# Patient Record
Sex: FEMALE
[De-identification: names, ages, dates, MRNs, and addresses within clinical notes are randomized per-mention and may not be internally consistent; named-entity substitution may affect disease eponyms.]

---

## 2021-02-10 ENCOUNTER — NURSE TRIAGE (OUTPATIENT)
Dept: OTHER | Facility: CLINIC | Age: 36
End: 2021-02-10

## 2021-02-10 NOTE — TELEPHONE ENCOUNTER
Reason for Disposition   Burn area larger than 4 palms of hand (> 4% BSA)    Answer Assessment - Initial Assessment Questions  1. ONSET: \"When did it happen? \" If happened < 3 hours ago, ask: \"Did you apply cold water? \" If not, give First Aid Advice immediately. Happened about 5 minutes ago  Pt applied cold water     2. LOCATION: \"Where is the burn located? \"       Right upper thigh     3. BURN SIZE: \"How large is the burn? \"  The palm is roughly 1% of the total body surface area (BSA). \" From knee to private area\"     4. SEVERITY OF THE BURN: \"Are there any blisters? \"      2 blisters    5. MECHANISM: \"Tell me how it happened. \"      Pt was making tea and spilled hot water on her leg     6. PAIN: David Wilma you having any pain? \" \"How bad is the pain? \" (Scale 1-10; or mild, moderate, severe)    - MILD (1-3): doesn't interfere with normal activities     - MODERATE (4-7): interferes with normal activities or awakens from sleep     - SEVERE (8-10): excruciating pain, unable to do any normal activities       If she does not apply water she is in pain 3/10    7. INHALATION INJURY: \"Were you exposed to any smoke or fumes? \" If yes: \"Do you have any cough or difficulty breathing? \"      N/A    8. OTHER SYMPTOMS: \"Do you have any other symptoms? \" (e.g., headache, nausea)      Denies    9. PREGNANCY: \"Is there any chance you are pregnant? \" \"When was your last menstrual period? \"      Denies    Protocols used: BURNS-ADULT-[place]    Brief description of triage: burn to Right leg. See assessment above. Triage indicates for patient to Go to ED now. Pt and caller in agreement with this plan. Care advice provided, patient verbalizes understanding; denies any other questions or concerns; instructed to call back for any new or worsening symptoms. This triage is a result of a call to [address] [place] [address]. Please do not respond to the triage nurse through this encounter.  Any subsequent communication should be directly with the patient.

## 2024-11-15 ENCOUNTER — OFFICE VISIT (OUTPATIENT)
Dept: URGENT CARE | Age: 39
End: 2024-11-15
Payer: COMMERCIAL

## 2024-11-15 VITALS
HEIGHT: 62 IN | OXYGEN SATURATION: 97 % | TEMPERATURE: 97.5 F | WEIGHT: 140 LBS | HEART RATE: 73 BPM | BODY MASS INDEX: 25.76 KG/M2

## 2024-11-15 DIAGNOSIS — H10.13 ACUTE ALLERGIC CONJUNCTIVITIS, BILATERAL: Primary | ICD-10-CM

## 2024-11-15 DIAGNOSIS — H16.203 KERATOCONJUNCTIVITIS OF BOTH EYES: ICD-10-CM

## 2024-11-15 RX ORDER — OLOPATADINE HYDROCHLORIDE 1 MG/ML
1 SOLUTION/ DROPS OPHTHALMIC 2 TIMES DAILY
Qty: 5 ML | Refills: 0 | Status: SHIPPED | OUTPATIENT
Start: 2024-11-15 | End: 2024-12-15

## 2024-11-15 RX ORDER — ERYTHROMYCIN 5 MG/G
OINTMENT OPHTHALMIC 4 TIMES DAILY
Qty: 3.5 G | Refills: 0 | Status: SHIPPED | OUTPATIENT
Start: 2024-11-15 | End: 2024-11-22

## 2024-11-15 ASSESSMENT — VISUAL ACUITY: OS_CC: 20/15

## 2024-11-15 NOTE — PROGRESS NOTES
"Subjective   Patient ID: Shahrzad Nielson is a 39 y.o. female. They present today with a chief complaint of Burning Eyes (Bilat eye irritation x 1 week. Using meds from [facility] pharmacy).    History of Present Illness  HPI  1 week of bilateral eye irritation. Described as sandpaper in the eyes. No blurry vision, contact lens use, allergic rhinitis, watery eyes, discharge. [place] cream that may be helping some.     Past Medical History  Allergies as of 11/15/2024    (No Known Allergies)       (Not in a hospital admission)             No past medical history on file.    No past surgical history on file.         Review of Systems  Review of Systems   Review of systems: A complete review of systems was done, and is as stated in the history of present illness, is otherwise negative or not pertinent to the complaint.       Objective    Vitals:    11/15/24 1226   Pulse: 73   Temp: 36.4 °C (97.5 °F)   TempSrc: Oral   SpO2: 97%   Weight: 63.5 kg (140 lb)   Height: 1.575 m (5' 2\")     No LMP recorded.    Physical Exam  Visual grossly intact and 20/20 each eye. No field deficit. PERRLA. No icterus. EOMI. Lacrimal structures intact and grossly normal. Cornea clear. No hyphema. Conjuctiva injected. No chemosis. No globe tenderness.        Procedures    Point of Care Test & Imaging Results from this visit  No results found for this or any previous visit.    Assessment/Plan   Allergies, medications, history, and pertinent labs/EKGs/Imaging reviewed by Ervin Harris PA-C.     Medical Decision Making  -ddx sjogren's, allergic conjunctivitis, viral conjunctivitis   -no signs of keratitis, lacrimal disorder  -topical patanol and erythromycin ointment   -close fu with eye dr referral  -all ?s answered     Orders and Diagnoses  Diagnoses and all orders for this visit:  Acute allergic conjunctivitis, bilateral  -     erythromycin (Romycin) 5 mg/gram (0.5 %) ophthalmic ointment; Apply to affected eye(s) 4 times a " day for 7 days. Apply Amount per Dose: 0.25 inch (~0.5 cm) per dose.  -     olopatadine (Patanol) 0.1 % ophthalmic solution; Administer 1 drop into both eyes 2 times a day.  -     Referral to Ophthalmology; Future

## 2025-05-26 ENCOUNTER — OFFICE VISIT (OUTPATIENT)
Dept: URGENT CARE | Age: 40
End: 2025-05-26
Payer: COMMERCIAL

## 2025-05-26 VITALS
TEMPERATURE: 98.3 F | HEART RATE: 77 BPM | DIASTOLIC BLOOD PRESSURE: 71 MMHG | OXYGEN SATURATION: 96 % | RESPIRATION RATE: 16 BRPM | WEIGHT: 149 LBS | BODY MASS INDEX: 27.25 KG/M2 | SYSTOLIC BLOOD PRESSURE: 104 MMHG

## 2025-05-26 DIAGNOSIS — L25.9 CONTACT DERMATITIS, UNSPECIFIED CONTACT DERMATITIS TYPE, UNSPECIFIED TRIGGER: Primary | ICD-10-CM

## 2025-05-26 RX ORDER — PREDNISONE 10 MG/1
TABLET ORAL
Qty: 20 TABLET | Refills: 0 | Status: SHIPPED | OUTPATIENT
Start: 2025-05-26 | End: 2025-06-04

## 2025-05-26 RX ORDER — METHYLPREDNISOLONE SODIUM SUCCINATE 125 MG/2ML
125 INJECTION INTRAMUSCULAR; INTRAVENOUS ONCE
Status: COMPLETED | OUTPATIENT
Start: 2025-05-26 | End: 2025-05-26

## 2025-05-26 RX ORDER — PREDNISONE 10 MG/1
TABLET ORAL
Qty: 21 TABLET | Refills: 0 | Status: SHIPPED | OUTPATIENT
Start: 2025-05-26 | End: 2025-05-26

## 2025-05-26 RX ADMIN — METHYLPREDNISOLONE SODIUM SUCCINATE 125 MG: 125 INJECTION INTRAMUSCULAR; INTRAVENOUS at 10:44

## 2025-05-26 ASSESSMENT — PAIN SCALES - GENERAL: PAINLEVEL_OUTOF10: 0-NO PAIN

## 2025-05-26 NOTE — PROGRESS NOTES
Subjective   Patient ID: Shahrzad Nielson is a 39 y.o. female. They present today with a chief complaint of Rash (Pt states rash the started with left ear and spreaded  downwards, states it is itchy.).    History of Present Illness  Patient is a 39-year-old female with no reported past medical history presents urgent care today with a complaint of ongoing, worsening rash.  She states her symptoms developed several days ago.  She notes symptoms started on the left side of her neck and face but have spread to the right side of her neck.  She describes it as extremely itchy.  She is uncertain of what she could have gotten into to cause her symptoms.  She has been using triamcinolone cream and taking Benadryl without significant relief.  She denies any other symptoms or complaints.      History provided by:  Patient  Rash        Past Medical History  Allergies as of 05/26/2025    (No Known Allergies)       Prescriptions Prior to Admission[1]       Medical History[2]    Surgical History[3]     reports that she has never smoked. She has never used smokeless tobacco. She reports that she does not drink alcohol and does not use drugs.    Review of Systems  Review of Systems   Skin:  Positive for rash.                                  Objective    Vitals:    05/26/25 1013   BP: 104/71   Pulse: 77   Resp: 16   Temp: 36.8 °C (98.3 °F)   SpO2: 96%   Weight: 67.6 kg (149 lb)     No LMP recorded (lmp unknown).    Physical Exam  Vitals and nursing note reviewed.   Constitutional:       General: She is not in acute distress.     Appearance: Normal appearance. She is not ill-appearing, toxic-appearing or diaphoretic.   HENT:      Head: Normocephalic and atraumatic.      Mouth/Throat:      Mouth: Mucous membranes are moist.   Eyes:      Extraocular Movements: Extraocular movements intact.      Conjunctiva/sclera: Conjunctivae normal.      Pupils: Pupils are equal, round, and reactive to light.   Cardiovascular:      Rate and Rhythm:  Normal rate and regular rhythm.      Pulses: Normal pulses.      Heart sounds: Normal heart sounds.   Pulmonary:      Effort: Pulmonary effort is normal. No respiratory distress.      Breath sounds: Normal breath sounds. No stridor. No wheezing, rhonchi or rales.   Chest:      Chest wall: No tenderness.   Musculoskeletal:         General: Normal range of motion.      Cervical back: Normal range of motion and neck supple.   Skin:     General: Skin is warm and dry.      Capillary Refill: Capillary refill takes less than 2 seconds.      Findings: Rash present. Rash is macular and papular.          Neurological:      General: No focal deficit present.      Mental Status: She is alert and oriented to person, place, and time.   Psychiatric:         Mood and Affect: Mood normal.         Behavior: Behavior normal.         Procedures      Assessment/Plan   Allergies, medications, history, and pertinent labs/EKGs/Imaging reviewed by HOLLY Jama.     Medical Decision Making    Patient is well appearing, afebrile, non toxic, not hypoxic, and appropriate for outpatient treatment and management at time of evaluation. Patient presents with ongoing, worsening pruritic rash.     Differential includes but not limited to: Contact dermatitis, allergic reaction, cellulitis, other    On exam, patient has erythematous, macular/papular, pruritic rash on left side of face and neck as well as right side of neck.  Rash is localized to this area only.  No significant facial swelling or airway compromise.  Patient is afebrile with normal vitals.  Oxygen saturation on room air is 96%.  No clinical signs of cellulitis or infection.  Suspect symptoms are secondary to contact dermatitis.  Patient received 125 mg of Solu-Medrol IM and was closely monitored in clinic for approximately 10 minutes without signs of complication.    Counseling: Spoke with the patient and discussed today´s findings, in addition to providing specific details  for the plan of care and expected course.  Recommended continued use of over-the-counter antihistamine such as Claritin or Zyrtec as well as continued use of previously prescribed triamcinolone cream.  She was also provided with a short course of prednisone to take as directed.  Patient was given the opportunity to ask questions.     Discussed return precautions and importance of follow-up. Advised to follow-up with PCP.  We spoke at length regarding the red flags he/she should be aware of and monitor for.  I specifically advised to go to the ED for changing or worsening symptoms, new symptoms, complaint specific precautions, and precautions listed on the discharge paperwork.    The plan of care was mutually agreed upon with the patient. All questions and concerns were answered to the best of my ability with today's information.  Patient was discharged in stable condition.    Dictation software was used in the creation of this note which does not evaluate or correct for typographical, spelling, syntax or grammatical errors.    Orders and Diagnoses  Diagnoses and all orders for this visit:  Contact dermatitis, unspecified contact dermatitis type, unspecified trigger  -     methylPREDNISolone sodium succinate (PF) (SOLU-Medrol) injection 125 mg  -     predniSONE (Deltasone) 10 mg tablet; Take 3 tablets (30 mg) by mouth once daily for 4 days, THEN 2 tablets (20 mg) once daily for 3 days, THEN 1 tablet (10 mg) once daily for 3 days.      Medical Admin Record      Follow Up Instructions  No follow-ups on file.    Patient disposition: Home    Electronically signed by HOLLY Jama  10:32 AM       [1] (Not in a hospital admission)  [2] History reviewed. No pertinent past medical history.  [3] History reviewed. No pertinent surgical history.

## 2025-05-26 NOTE — PATIENT INSTRUCTIONS
You were seen at Urgent Care today for a rash. Please treat as discussed. Please take medications as prescribed. Monitor for red flags which we spoke about, If your symptoms change, worsen or become concerning in any way, please go to the emergency room immediately, otherwise you can followup with your PCP in 2-3 days as needed